# Patient Record
(demographics unavailable — no encounter records)

---

## 2024-10-23 NOTE — SOCIAL HISTORY
[Lives with Spouse] : lives with spouse [Employed] : employed [] :  [Graduate School] : graduate school [None] : none [No Known Substance Use] : no known substance use [FreeTextEntry1] : [place] Graduated [employer] iun 2008, Lives with , Cristofer and son Jose Luis. Working as an in house  firm

## 2024-10-23 NOTE — SOCIAL HISTORY
[Lives with Spouse] : lives with spouse [Employed] : employed [] :  [Graduate School] : graduate school [None] : none [No Known Substance Use] : no known substance use [FreeTextEntry1] : Born in [place], [place] Graduated [employer] iun 2008, Lives with , Cristofer and son Jose Luis. Working as an in house  firm

## 2024-10-23 NOTE — PLAN
[No] : No [Medication education provided] : Medication education provided. [Rationale for medication choices, possible risks/precautions, benefits, alternative treatment choices, and consequences of non-treatment discussed] : Rationale for medication choices, possible risks/precautions, benefits, alternative treatment choices, and consequences of non-treatment discussed with patient/family/caregiver  [Order(s) for medication placed in [facility] EHR] : Medication

## 2024-10-23 NOTE — DISCUSSION/SUMMARY
[Plan Review] : Plan Review [Able to manage surrounding demands and opportunities] : able to manage surrounding demands and opportunities [Able to exercise self-direction] : able to exercise self-direction [Able to set and pursue goals] : able to set and pursue goals [Adherent to treatment recommendations] : adherent to treatment recommendations [Articulate] : articulate [Attempting to realize their potential] : Attempting to realize their potential [Cognitively intact] : cognitively intact [Good impulse control] : good impulse control [Insightful] : insightful [Creative] : creative [Intelligent] : intelligent [Motivated to participate in treatment] : motivated to participate in treatment [Health literacy] : health literacy [In good health] : in good health [Financially stable] : financially stable [Part of a supportive marriage] : part of a supportive marriage [Part of a supportive family] : part of a supportive family [Steady employment] : steady employment [Housing stability] : housing stability [English fluency] : English fluency [Connected to healthcare] : connected to healthcare [Access to safe outdoor spaces] : access to safe outdoor spaces [Social supports] : social supports [FreeTextEntry2] : 10/23/25 [FreeTextEntry3] : January 2020 [FreeTextEntry5] : to feel less overwhelmed and set boundaries at home  [FreeTextEntry8] : none identified [FreeTextEntry9] : none identified [Mental Health] : Mental Health [Continued - Progress made] : Continued - Progress made: [every ___ months] : every [unfilled] months [Improvement in symptoms as evidenced by:] : Improvement in symptoms as evidenced by: [None - Reason others did not participate:] : None - Reason others did not participate:  [Yes] : Yes [FreeTextEntry4] : understanding the nature of anxiety around parenthood and developing coping skills [de-identified] : building insight into roots of her anxieties [de-identified] : increasing insight into the ways that her family affects her feelings [de-identified] : one year [de-identified] : increased ability to care for self [de-identified] : one year [de-identified] : self advocate and continue medication management treatment [de-identified] : dr. sánchez [de-identified] : dr. sánchez [de-identified] : patient report [Date of Last Physical Exam: _____] : Date of Last Physical Exam: [unfilled] [Date of Last Annual Labs: _____] : Date of Last Annual Labs: [unfilled] [Annual Review of Systems Completed?] : Annual Review of Systems Completed: Yes [Tobacco Screening Completed?] : Tobacco Screening Completed: Yes [Potential impact of patient’s physical health conditions on psychiatric care?] : Potential impact of patient's physical health conditions on psychiatric care: Yes [Does patient require any additional health services or referrals?] : Does patient require any additional health services or referrals: No [FreeTextEntry1] : Patient is a 41 year-old, white, cis-gender, , female, with CP followed in [facility], with a history of treated anxiety and depression, who gave birth via  her son Jose Luis in 2019 presenting follow with acute anxiety and depression. Patient has a strong support system. Symptoms stable with current treatment.No current risks reported.  continue duloxetine 60mg per day for anxiety.   continue gabapentin 100mg PO BID PRN anxiety. continue individual therapy with private therapist and couples therapy. Patient aware of emergency protocol: call 911 or go to nearest ED in case of emergency, urgent symptoms, crisis, suicidality or homicidality. Patient can contact writer's office or  for routine and non-urgent questions or concerns. Treatment plan updated 10/23/24

## 2024-10-23 NOTE — HISTORY OF PRESENT ILLNESS
[No] : no [de-identified] : Interval history review. Discussed work updates. Using aura ring, trying to get pregnant. No SE reported. Reviewed compliance with medications and treatment options. Instructed regarding treatment follow up. Patient verbalized understanding.   Psychotherapy Provided: [ X] supportive [ ]psychodynamic [ ] other. Discussed: Managing work and life balance. Responded well to therapeutic intervention and left in stable mood. 30 minutes.  Risks and benefits of treatment options and alternatives to treatment discussed with patient. Cymbalta in pregnancy and breast feeding discussed including r/b/se,  discontinuation syndrome, possible increased risk of  pulmonary hypertension,  birth and lower bw. Also weighing risks of untreated PMADs and data on SSRI and SNRI use in pregnancy. Reviewed importance of compliance with medications and treatment options. Instructed regarding treatment follow up. Diagnostic results/impressions and/or recommended studies. Provided psychoeducation about diagnosis and treatment. Encouraged behavioral activation and reviewed sleep hygiene.  Child name (s) and : Jose Luis, 2019 DAVID 2nd - 24 Issues in pregnancy or postpartum: planned pregnancy, severe morning sickness Feeding: pumped/bottle fed, challenges with breastfeeding which was distressing. Stopped pumping 2020

## 2024-10-23 NOTE — DISCUSSION/SUMMARY
[Plan Review] : Plan Review [Able to manage surrounding demands and opportunities] : able to manage surrounding demands and opportunities [Able to exercise self-direction] : able to exercise self-direction [Able to set and pursue goals] : able to set and pursue goals [Adherent to treatment recommendations] : adherent to treatment recommendations [Articulate] : articulate [Attempting to realize their potential] : Attempting to realize their potential [Cognitively intact] : cognitively intact [Good impulse control] : good impulse control [Insightful] : insightful [Creative] : creative [Intelligent] : intelligent [Motivated to participate in treatment] : motivated to participate in treatment [Health literacy] : health literacy [In good health] : in good health [Financially stable] : financially stable [Part of a supportive marriage] : part of a supportive marriage [Part of a supportive family] : part of a supportive family [Steady employment] : steady employment [Housing stability] : housing stability [English fluency] : English fluency [Connected to healthcare] : connected to healthcare [Access to safe outdoor spaces] : access to safe outdoor spaces [Social supports] : social supports [FreeTextEntry2] : 10/23/25 [FreeTextEntry3] : January 2020 [FreeTextEntry5] : to feel less overwhelmed and set boundaries at home  [FreeTextEntry8] : none identified [FreeTextEntry9] : none identified [Mental Health] : Mental Health [Continued - Progress made] : Continued - Progress made: [every ___ months] : every [unfilled] months [Improvement in symptoms as evidenced by:] : Improvement in symptoms as evidenced by: [None - Reason others did not participate:] : None - Reason others did not participate:  [Yes] : Yes [FreeTextEntry4] : understanding the nature of anxiety around parenthood and developing coping skills [de-identified] : building insight into roots of her anxieties [de-identified] : increasing insight into the ways that her family affects her feelings [de-identified] : one year [de-identified] : increased ability to care for self [de-identified] : one year [de-identified] : self advocate and continue medication management treatment [de-identified] : dr. sánchez [de-identified] : dr. sánchez [de-identified] : patient report [Date of Last Physical Exam: _____] : Date of Last Physical Exam: [unfilled] [Date of Last Annual Labs: _____] : Date of Last Annual Labs: [unfilled] [Annual Review of Systems Completed?] : Annual Review of Systems Completed: Yes [Tobacco Screening Completed?] : Tobacco Screening Completed: Yes [Potential impact of patient’s physical health conditions on psychiatric care?] : Potential impact of patient's physical health conditions on psychiatric care: Yes [Does patient require any additional health services or referrals?] : Does patient require any additional health services or referrals: No [FreeTextEntry1] : Patient is a 41 year-old, white, cis-gender, , female, with CP followed in [facility], with a history of treated anxiety and depression, who gave birth via  her son Jose Luis in 2019 presenting follow with acute anxiety and depression. Patient has a strong support system. Symptoms stable with current treatment.No current risks reported.  continue duloxetine 60mg per day for anxiety.   continue gabapentin 100mg PO BID PRN anxiety. continue individual therapy with private therapist and couples therapy. Patient aware of emergency protocol: call 911 or go to nearest ED in case of emergency, urgent symptoms, crisis, suicidality or homicidality. Patient can contact writer's office or  for routine and non-urgent questions or concerns. Treatment plan updated 10/23/24

## 2024-10-23 NOTE — PAST MEDICAL HISTORY
[FreeTextEntry1] : In psych treatment since teens. Depression first at age 20. Medication history: Zoloft, Wellbutrin (seizure) and Cymbalta (most effective) between ages 20-34. \par

## 2024-10-23 NOTE — REASON FOR VISIT
[Follow-Up Visit] : a follow-up visit [Home] : at home, [unfilled] , at the time of the visit. [Other Location: e.g. Home (Enter Location, City,State)___] : at [unfilled] [Verbal consent obtained from patient] : the patient, [unfilled] [FreeTextEntry1] : med management, follow up "anxiety"

## 2024-10-23 NOTE — PLAN
[No] : No [Medication education provided] : Medication education provided. [Rationale for medication choices, possible risks/precautions, benefits, alternative treatment choices, and consequences of non-treatment discussed] : Rationale for medication choices, possible risks/precautions, benefits, alternative treatment choices, and consequences of non-treatment discussed with patient/family/caregiver  [Order(s) for medication placed in [facility] EHR] : Medication

## 2024-10-23 NOTE — PHYSICAL EXAM
[No] : No [Normal] : good [] : No [0 - 1 or 2] : 0 - 1 or 2 [0 - Never] : 0 - Never [0 - No] : 0 - No [6] : 6 [4] : 4 [3] : 3 [5] : 5 [FreeTextEntry1] : 0 [SchwartzScore] : 44 [Anxious] : no anxious [Irritable] : no irritable [Constricted] : not constricted

## 2024-10-23 NOTE — HISTORY OF PRESENT ILLNESS
[No] : no [de-identified] : Interval history review. Discussed work updates. Using aura ring, trying to get pregnant. No SE reported. Reviewed compliance with medications and treatment options. Instructed regarding treatment follow up. Patient verbalized understanding.   Psychotherapy Provided: [ X] supportive [ ]psychodynamic [ ] other. Discussed: Managing work and life balance. Responded well to therapeutic intervention and left in stable mood. 30 minutes.  Risks and benefits of treatment options and alternatives to treatment discussed with patient. Cymbalta in pregnancy and breast feeding discussed including r/b/se,  discontinuation syndrome, possible increased risk of  pulmonary hypertension,  birth and lower bw. Also weighing risks of untreated PMADs and data on SSRI and SNRI use in pregnancy. Reviewed importance of compliance with medications and treatment options. Instructed regarding treatment follow up. Diagnostic results/impressions and/or recommended studies. Provided psychoeducation about diagnosis and treatment. Encouraged behavioral activation and reviewed sleep hygiene.  Child name (s) and : Jose Luis, 2019 DAVID 2nd - 24 Issues in pregnancy or postpartum: planned pregnancy, severe morning sickness Feeding: pumped/bottle fed, challenges with breastfeeding which was distressing. Stopped pumping 2020

## 2024-10-23 NOTE — RESULTS/DATA
[FreeTextEntry1] : PCP: Jose Fay MD\par  [facility]\par  Labs November 2022: Ferritin low, thyroid panel (possibly hypothyroid)

## 2024-12-04 NOTE — PHYSICAL EXAM
[No] : No [3] : 3 [4] : 4 [5] : 5 [SchwartzScore] : 44 [Anxious] : no anxious [Irritable] : no irritable [Constricted] : not constricted [Normal] : good

## 2024-12-04 NOTE — REASON FOR VISIT
[Follow-Up Visit] : a follow-up visit [FreeTextEntry1] : med management, follow up "anxiety" [Home] : at home, [unfilled] , at the time of the visit. [Other Location: e.g. Home (Enter Location, City,State)___] : at [unfilled] [Verbal consent obtained from patient] : the patient, [unfilled]

## 2024-12-04 NOTE — HISTORY OF PRESENT ILLNESS
[de-identified] : Interval history review. Discussed IVF updates. Very upset over the process. No SE reported. Reviewed compliance with medications and treatment options. Instructed regarding treatment follow up. Patient verbalized understanding.   Psychotherapy Provided: [ X] supportive [ ]psychodynamic [ ] other. Discussed: Managing work and life balance and IVF. Responded well to therapeutic intervention and left in stable mood. 30 minutes.  Risks and benefits of treatment options and alternatives to treatment discussed with patient. Cymbalta in pregnancy and breast feeding discussed including r/b/se,  discontinuation syndrome, possible increased risk of  pulmonary hypertension,  birth and lower bw. Also weighing risks of untreated PMADs and data on SSRI and SNRI use in pregnancy. Reviewed importance of compliance with medications and treatment options. Instructed regarding treatment follow up. Diagnostic results/impressions and/or recommended studies. Provided psychoeducation about diagnosis and treatment. Encouraged behavioral activation and reviewed sleep hygiene.  Child name (s) and : Jose Luis, 2019 DAVID 2nd - 24 Issues in pregnancy or postpartum: planned pregnancy, severe morning sickness Feeding: pumped/bottle fed, challenges with breastfeeding which was distressing. Stopped pumping 2020     [No] : no

## 2024-12-04 NOTE — PLAN
[No] : No [Medication education provided] : Medication education provided. [Rationale for medication choices, possible risks/precautions, benefits, alternative treatment choices, and consequences of non-treatment discussed] : Rationale for medication choices, possible risks/precautions, benefits, alternative treatment choices, and consequences of non-treatment discussed with patient/family/caregiver  [Order(s) for medication placed in [facility] EHR] : Medication

## 2024-12-04 NOTE — SOCIAL HISTORY
[Lives with Spouse] : lives with spouse [Employed] : employed [] :  [Graduate School] : graduate school [None] : none [FreeTextEntry1] : Born in [place], [place] Graduated [employer] iun 2008, Lives with , Cristofer and son Jose Luis. Working as an in house  firm    [No Known Substance Use] : no known substance use

## 2024-12-04 NOTE — DISCUSSION/SUMMARY
[FreeTextEntry1] : Patient is a 42 year-old, white, cis-gender, , female, with CP followed in [facility], with a history of treated anxiety and depression, who gave birth via  her son Jose Luis in 2019 presenting follow with acute anxiety and depression. Patient has a strong support system. Symptoms stable with current treatment.No current risks reported.  continue duloxetine 60mg per day for anxiety.   continue gabapentin 100mg PO BID PRN anxiety. continue individual therapy with private therapist and couples therapy. Patient aware of emergency protocol: call 911 or go to nearest ED in case of emergency, urgent symptoms, crisis, suicidality or homicidality. Patient can contact writer's office or  for routine and non-urgent questions or concerns. Treatment plan updated 10/23/24

## 2025-01-15 NOTE — REVIEW OF SYSTEMS
Verified Results  (1) COMPREHENSIVE METABOLIC PANEL [id number] 60:78PL [employer] [employer]     Test Name Result Flag Reference   SODIUM 140 mmol/L  136-145   POTASSIUM 4 1 mmol/L  3 5-5 3   CHLORIDE 105 mmol/L  100-108   CARBON DIOXIDE 30 mmol/L  21-32   ANION GAP (CALC) 5 mmol/L  4-13   BLOOD UREA NITROGEN 16 mg/dL  5-25   CREATININE 0 78 mg/dL  0 60-1 30   Standardized to IDMS reference method   CALCIUM 9 7 mg/dL  8 3-10 1   BILI, TOTAL 0 48 mg/dL  0 20-1 00   ALK PHOSPHATAS 140 U/L H    ALT (SGPT) 39 U/L  12-78   AST(SGOT) 29 U/L  5-45   ALBUMIN 3 6 g/dL  3 5-5 0   TOTAL PROTEIN 7 0 g/dL  6 4-8 2   eGFR Non-African American      >60 0 ml/min/1 73sq [facility] Disease Education Program recommendations are as follows:  GFR calculation is accurate only with a steady state creatinine  Chronic Kidney disease less than 60 ml/min/1 73 sq  meters  Kidney failure less than 15 ml/min/1 73 sq  meters  GLUCOSE FASTING 87 mg/dL  65-99     (1) LIPID PANEL, FASTING [id number] 09:20AM [employer]     Test Name Result Flag Reference   CHOLESTEROL 179 mg/dL     HDL,DIRECT 30 mg/dL L 40-60   Specimen collection should occur prior to Metamizole administration due to the potential for falsely depressed results  LDL CHOLESTEROL CALCULATED 125 mg/dL H 0-100   This is a fasting blood test  Water,black tea or black  coffee only after 9:00pm the night before test  Drink 2 glasses of water the morning of test         Triglyceride:         Normal              <150 mg/dl       Borderline High    150-199 mg/dl       High               200-499 mg/dl       Very High          >499 mg/dl  Cholesterol:         Desirable        <200 mg/dl      Borderline High  200-239 mg/dl      High             >239 mg/dl  HDL Cholesterol:        High    >59 mg/dL      Low     <41 mg/dL  LDL CALCULATED:    This screening LDL is a calculated result    It does not have the accuracy of the Direct Measured LDL in the monitoring of patients with hyperlipidemia and/or statin therapy  Direct Measure LDL ([id number]) must be ordered separately in these patients  TRIGLYCERIDES 120 mg/dL  <=150   Specimen collection should occur prior to N-Acetylcysteine or Metamizole administration due to the potential for falsely depressed results  [Negative] : Psychiatric

## 2025-01-15 NOTE — HISTORY OF PRESENT ILLNESS
[de-identified] : Interval history review. Discussed work promotion. Doesnt want to change jobs because of ivf plans. Had a cycle and didnt get to retrieval.  step mother passed away. Discussed health of father in law. Jose Luis may have adhd. No SE reported. Reviewed compliance with medications and treatment options. Instructed regarding treatment follow up. Patient verbalized understanding.   Psychotherapy Provided: [ X] supportive [ ]psychodynamic [ ] other. Discussed: Managing work and life balance and IVF. Responded well to therapeutic intervention and left in stable mood. 30 minutes.  Risks and benefits of treatment options and alternatives to treatment discussed with patient. Cymbalta in pregnancy and breast feeding discussed including r/b/se,  discontinuation syndrome, possible increased risk of  pulmonary hypertension,  birth and lower bw. Also weighing risks of untreated PMADs and data on SSRI and SNRI use in pregnancy. Reviewed importance of compliance with medications and treatment options. Instructed regarding treatment follow up. Diagnostic results/impressions and/or recommended studies. Provided psychoeducation about diagnosis and treatment. Encouraged behavioral activation and reviewed sleep hygiene.  Child name (s) and : Jose Luis, 2019 DAVID 2nd - 24 Issues in pregnancy or postpartum: planned pregnancy, severe morning sickness Feeding: pumped/bottle fed, challenges with breastfeeding which was distressing. Stopped pumping 2020     [No] : no

## 2025-01-15 NOTE — PLAN
[No] : No [Medication education provided] : Medication education provided. [Rationale for medication choices, possible risks/precautions, benefits, alternative treatment choices, and consequences of non-treatment discussed] : Rationale for medication choices, possible risks/precautions, benefits, alternative treatment choices, and consequences of non-treatment discussed with patient/family/caregiver  [Order(s) for medication placed in [facility] EHR] : Medication

## 2025-01-15 NOTE — DISCUSSION/SUMMARY
[FreeTextEntry1] : Patient is a 42 year-old, white, cis-gender, , female, with CP followed in [facility], with a history of treated anxiety and depression, who gave birth via  her son Jose Luis in 2019 presenting follow with acute anxiety and depression. Patient has a strong support system. Symptoms stable with current treatment.No current risks reported.  continue duloxetine 60mg per day for anxiety.   continue gabapentin 100mg PO BID PRN anxiety. continue individual therapy with private therapist and couples therapy. Patient aware of emergency protocol: call 911 or go to nearest ED in case of emergency, urgent symptoms, crisis, suicidality or homicidality. Patient can contact writer's office or  for routine and non-urgent questions or concerns. Treatment plan updated 10/23/24

## 2025-01-15 NOTE — SOCIAL HISTORY
[Lives with Spouse] : lives with spouse [Employed] : employed [] :  [Graduate School] : graduate school [None] : none [FreeTextEntry1] : Born in [place], [place] Graduated [employer] iun 2008, Lives with , Cristofer and son Jose Luis. Working as an in house  firm    [No Known Substance Use] : no known substance use

## 2025-02-26 NOTE — SOCIAL HISTORY
[Lives with Spouse] : lives with spouse [Employed] : employed [] :  [Graduate School] : graduate school [None] : none [FreeTextEntry1] : Born in [place], [place] Graduated [employer] iun 2008, Lives with , Cristofer and son Jose Luis. Working as an in house  firm    [No Known Substance Use] : no known substance use

## 2025-02-26 NOTE — DISCUSSION/SUMMARY
[FreeTextEntry1] : Patient is a 42 year-old, white, cis-gender, , female, with CP followed in [facility], with a history of treated anxiety and depression, who gave birth via  her son Jose Luis in 2019 presenting follow with acute anxiety and depression. Patient has a strong support system. Symptoms stable with current treatment.No current risks reported.  continue duloxetine 60mg per day for anxiety.   continue gabapentin 100mg PO BID PRN anxiety. continue individual therapy with private therapist and couples therapy. Patient aware of emergency protocol: call 911 or go to nearest ED in case of emergency, urgent symptoms, crisis, suicidality or homicidality. Patient can contact writer's office or  for routine and non-urgent questions or concerns. Treatment plan updated 10/23/24   3

## 2025-02-26 NOTE — HISTORY OF PRESENT ILLNESS
[de-identified] : Interval history review. Discussed updates with son's testing for occupational therapy. adhd eval at neuro. Discussed ivf plans. Got two eggs retrieved but they werent mature. No SE reported. Reviewed compliance with medications and treatment options. Instructed regarding treatment follow up. Patient verbalized understanding.   Psychotherapy Provided: [ X] supportive [ ]psychodynamic [ ] other. Discussed: Managing work and life balance and IVF. Responded well to therapeutic intervention and left in stable mood. 30 minutes.  Risks and benefits of treatment options and alternatives to treatment discussed with patient. Cymbalta r/b/se. Reviewed importance of compliance with medications and treatment options. Instructed regarding treatment follow up. Diagnostic results/impressions and/or recommended studies. Provided psychoeducation about diagnosis and treatment. Encouraged behavioral activation and reviewed sleep hygiene.  Child name (s) and : [place], 2019 Issues in pregnancy or postpartum: planned pregnancy, severe morning sickness Feeding: pumped/bottle fed, challenges with breastfeeding which was distressing. Stopped pumping 2020     [No] : no

## 2025-02-26 NOTE — PLAN
----- Message from Angelina Bingham MD sent at 1/28/2019  2:02 PM CST -----  1. Benign adenoma polyps, poor prep. Please have a computer reminder for a Colonoscopy in ONE year for polyp follow-up. TWO DAY RENAL PREP. MAC sedation  2. NO reported H pylori or Rueda's esophagus. There are some GERD changes. All biopsies are benign    The pathology results demonstrated Tubular adenoma.         [No] : No [Medication education provided] : Medication education provided. [Rationale for medication choices, possible risks/precautions, benefits, alternative treatment choices, and consequences of non-treatment discussed] : Rationale for medication choices, possible risks/precautions, benefits, alternative treatment choices, and consequences of non-treatment discussed with patient/family/caregiver  [Order(s) for medication placed in [facility] EHR] : Medication

## 2025-04-02 NOTE — SOCIAL HISTORY
[Lives with Spouse] : lives with spouse [Employed] : employed [] :  [Graduate School] : graduate school [None] : none [FreeTextEntry1] : Born in [place], [place] Graduated [employer] iun 2008, Lives with , Cristofer and son Jose Luis. Working as an in house  firm    [No Known Substance Use] : no known substance use

## 2025-04-02 NOTE — HISTORY OF PRESENT ILLNESS
[de-identified] : Interval history review. Spoke about work dilemmas. Discussed ivf plans. Will pursue at another facility. Jose Luis ramsey with adhd. Will help with placement. No SE reported. Reviewed compliance with medications and treatment options. Instructed regarding treatment follow up. Patient verbalized understanding.   Psychotherapy Provided: [ X] supportive [ ]psychodynamic [ ] other. Discussed: Managing work and life balance and IVF. Responded well to therapeutic intervention and left in stable mood. 30 minutes.  Risks and benefits of treatment options and alternatives to treatment discussed with patient. Cymbalta r/b/se. Reviewed importance of compliance with medications and treatment options. Instructed regarding treatment follow up. Diagnostic results/impressions and/or recommended studies. Provided psychoeducation about diagnosis and treatment. Encouraged behavioral activation and reviewed sleep hygiene.  Child name (s) and : [place], 2019 Issues in pregnancy or postpartum: planned pregnancy, severe morning sickness Feeding: pumped/bottle fed, challenges with breastfeeding which was distressing. Stopped pumping 2020     [No] : no

## 2025-04-02 NOTE — DISCUSSION/SUMMARY
[FreeTextEntry1] : Patient is a 42 year-old, white, cis-gender, , female, with CP followed in [facility], with a history of treated anxiety and depression, who gave birth via  her son Jose Luis in 2019 presenting follow with acute anxiety and depression. Patient has a strong support system. Symptoms stable with current treatment.No current risks reported.  continue duloxetine 60mg per day for anxiety.   continue gabapentin 100mg PO BID PRN anxiety. continue individual therapy with private therapist and couples therapy. Patient aware of emergency protocol: call 911 or go to nearest ED in case of emergency, urgent symptoms, crisis, suicidality or homicidality. Patient can contact writer's office or  for routine and non-urgent questions or concerns. Treatment plan updated 10/23/24

## 2025-04-02 NOTE — PLAN
[No] : No [Medication education provided] : Medication education provided. [Rationale for medication choices, possible risks/precautions, benefits, alternative treatment choices, and consequences of non-treatment discussed] : Rationale for medication choices, possible risks/precautions, benefits, alternative treatment choices, and consequences of non-treatment discussed with patient/family/caregiver  [Order(s) for medication placed in [facility] EHR] : Medication